# Patient Record
Sex: MALE | Race: WHITE | NOT HISPANIC OR LATINO | Employment: FULL TIME | URBAN - METROPOLITAN AREA
[De-identification: names, ages, dates, MRNs, and addresses within clinical notes are randomized per-mention and may not be internally consistent; named-entity substitution may affect disease eponyms.]

---

## 2022-02-01 ENCOUNTER — TELEPHONE (OUTPATIENT)
Dept: UROLOGY | Facility: CLINIC | Age: 27
End: 2022-02-01
Payer: COMMERCIAL

## 2022-02-01 NOTE — TELEPHONE ENCOUNTER
----- Message from Fernanda Knapp sent at 2/1/2022 11:45 AM CST -----  Contact: Patient  Patient would like a call back concerning questions about a vasectomy consult, before providing all of his information. Please call at Ph .[phone] (home)

## 2022-02-17 ENCOUNTER — OFFICE VISIT (OUTPATIENT)
Dept: UROLOGY | Facility: CLINIC | Age: 27
End: 2022-02-17
Payer: COMMERCIAL

## 2022-02-17 VITALS
HEIGHT: 70 IN | BODY MASS INDEX: 19.76 KG/M2 | WEIGHT: 138 LBS | DIASTOLIC BLOOD PRESSURE: 70 MMHG | TEMPERATURE: 98 F | RESPIRATION RATE: 18 BRPM | SYSTOLIC BLOOD PRESSURE: 120 MMHG | HEART RATE: 74 BPM

## 2022-02-17 DIAGNOSIS — Z30.2 ENCOUNTER FOR STERILIZATION: Primary | ICD-10-CM

## 2022-02-17 PROCEDURE — 3008F PR BODY MASS INDEX (BMI) DOCUMENTED: ICD-10-PCS | Mod: CPTII,S$GLB,, | Performed by: UROLOGY

## 2022-02-17 PROCEDURE — 3078F DIAST BP <80 MM HG: CPT | Mod: CPTII,S$GLB,, | Performed by: UROLOGY

## 2022-02-17 PROCEDURE — 1159F MED LIST DOCD IN RCRD: CPT | Mod: CPTII,S$GLB,, | Performed by: UROLOGY

## 2022-02-17 PROCEDURE — 3074F SYST BP LT 130 MM HG: CPT | Mod: CPTII,S$GLB,, | Performed by: UROLOGY

## 2022-02-17 PROCEDURE — 1160F RVW MEDS BY RX/DR IN RCRD: CPT | Mod: CPTII,S$GLB,, | Performed by: UROLOGY

## 2022-02-17 PROCEDURE — 3008F BODY MASS INDEX DOCD: CPT | Mod: CPTII,S$GLB,, | Performed by: UROLOGY

## 2022-02-17 PROCEDURE — 3078F PR MOST RECENT DIASTOLIC BLOOD PRESSURE < 80 MM HG: ICD-10-PCS | Mod: CPTII,S$GLB,, | Performed by: UROLOGY

## 2022-02-17 PROCEDURE — 1160F PR REVIEW ALL MEDS BY PRESCRIBER/CLIN PHARMACIST DOCUMENTED: ICD-10-PCS | Mod: CPTII,S$GLB,, | Performed by: UROLOGY

## 2022-02-17 PROCEDURE — 3074F PR MOST RECENT SYSTOLIC BLOOD PRESSURE < 130 MM HG: ICD-10-PCS | Mod: CPTII,S$GLB,, | Performed by: UROLOGY

## 2022-02-17 PROCEDURE — 99204 PR OFFICE/OUTPT VISIT, NEW, LEVL IV, 45-59 MIN: ICD-10-PCS | Mod: S$GLB,,, | Performed by: UROLOGY

## 2022-02-17 PROCEDURE — 99204 OFFICE O/P NEW MOD 45 MIN: CPT | Mod: S$GLB,,, | Performed by: UROLOGY

## 2022-02-17 PROCEDURE — 1159F PR MEDICATION LIST DOCUMENTED IN MEDICAL RECORD: ICD-10-PCS | Mod: CPTII,S$GLB,, | Performed by: UROLOGY

## 2022-02-17 NOTE — PROGRESS NOTES
Subjective:       Patient ID: John Bucio is a 26 y.o. male.    Chief Complaint: Sterilization (3 kids )      HPI:  26-year-old male with 3 children desires sterility    Past Medical History: History reviewed. No pertinent past medical history.    Past Surgical Historical: History reviewed. No pertinent surgical history.     Medications:      Past Social History:   Social History     Socioeconomic History    Marital status:    Tobacco Use    Smoking status: Current Every Day Smoker     Types: Cigarettes    Smokeless tobacco: Current User     Types: Chew   Substance and Sexual Activity    Alcohol use: Yes    Sexual activity: Yes     Partners: Female       Allergies: Review of patient's allergies indicates:  No Known Allergies     Family History: History reviewed. No pertinent family history.     Review of Systems:  Review of Systems   Constitutional: Negative for activity change and appetite change.   HENT: Negative for congestion and dental problem.    Eyes: Negative for visual disturbance.   Respiratory: Negative for chest tightness and shortness of breath.    Cardiovascular: Negative for chest pain.   Gastrointestinal: Negative for abdominal distention and abdominal pain.   Genitourinary: Negative for decreased urine volume, difficulty urinating, dysuria, enuresis, flank pain, frequency, genital sores, hematuria, penile discharge, penile pain, penile swelling, scrotal swelling, testicular pain and urgency.   Musculoskeletal: Negative for back pain and neck pain.   Skin: Negative for color change.   Neurological: Negative for dizziness.   Hematological: Negative for adenopathy.   Psychiatric/Behavioral: Negative for agitation, behavioral problems and confusion.       Physical Exam:  Physical Exam  Constitutional:       General: He is not in acute distress.     Appearance: He is well-developed and well-nourished.   HENT:      Head: Normocephalic and atraumatic.      Nose: Nose normal.      Mouth/Throat:       Mouth: Oropharynx is clear and moist.   Eyes:      General: No scleral icterus.     Extraocular Movements: EOM normal.      Conjunctiva/sclera: Conjunctivae normal.      Pupils: Pupils are equal, round, and reactive to light.   Neck:      Thyroid: No thyromegaly.      Trachea: No tracheal deviation.   Cardiovascular:      Rate and Rhythm: Normal rate and regular rhythm.      Heart sounds: Normal heart sounds.   Pulmonary:      Effort: Pulmonary effort is normal. No respiratory distress.      Breath sounds: Normal breath sounds. No wheezing or rales.   Abdominal:      General: Bowel sounds are normal. There is no distension.      Palpations: Abdomen is soft.      Tenderness: There is no abdominal tenderness. There is no guarding or rebound.   Genitourinary:     Penis: Normal. No tenderness.       Prostate: Normal.   Musculoskeletal:         General: No deformity or edema. Normal range of motion.      Cervical back: Neck supple.   Lymphadenopathy:      Cervical: No cervical adenopathy.   Skin:     General: Skin is warm and dry.      Findings: No erythema or rash.   Neurological:      Mental Status: He is alert and oriented to person, place, and time.      Cranial Nerves: No cranial nerve deficit.   Psychiatric:         Mood and Affect: Mood and affect normal.         Behavior: Behavior normal.         Assessment/Plan:       Problem List Items Addressed This Visit    None     Visit Diagnoses     Encounter for sterilization    -  Primary    Relevant Orders    Vasectomy             We had a long discussion regarding vasectomy including the risks and benefits.  The patient understands the permanent nature of the procedure he also understands the potential risks of vasectomy including but not limited to injury to the testicle loss of testicle bleeding hematoma infection and testicular atrophy.  Patient understands these risks is willing to proceed we will schedule vasectomy next available date.

## 2022-03-23 DIAGNOSIS — Z30.2 ENCOUNTER FOR STERILIZATION: Primary | ICD-10-CM

## 2022-03-23 RX ORDER — DIAZEPAM 10 MG/1
10 TABLET ORAL ONCE
Qty: 1 TABLET | Refills: 0 | Status: SHIPPED | OUTPATIENT
Start: 2022-04-05 | End: 2022-04-05

## 2022-03-25 ENCOUNTER — CLINICAL SUPPORT (OUTPATIENT)
Dept: UROLOGY | Facility: CLINIC | Age: 27
End: 2022-03-25
Payer: COMMERCIAL

## 2022-03-25 DIAGNOSIS — Z30.2 ENCOUNTER FOR STERILIZATION: Primary | ICD-10-CM

## 2022-03-25 NOTE — PROGRESS NOTES
Pre-op instructions and surgery consent for VAS reviewed with pt. Pt verbalized understanding. Surgery consent signed by pt.

## 2022-04-04 ENCOUNTER — TELEPHONE (OUTPATIENT)
Dept: UROLOGY | Facility: CLINIC | Age: 27
End: 2022-04-04
Payer: COMMERCIAL

## 2022-04-05 ENCOUNTER — TELEPHONE (OUTPATIENT)
Dept: UROLOGY | Facility: CLINIC | Age: 27
End: 2022-04-05

## 2022-04-05 ENCOUNTER — PROCEDURE VISIT (OUTPATIENT)
Dept: UROLOGY | Facility: CLINIC | Age: 27
End: 2022-04-05
Payer: COMMERCIAL

## 2022-04-05 VITALS
HEIGHT: 70 IN | SYSTOLIC BLOOD PRESSURE: 116 MMHG | TEMPERATURE: 97 F | WEIGHT: 136.25 LBS | HEART RATE: 73 BPM | RESPIRATION RATE: 20 BRPM | OXYGEN SATURATION: 99 % | DIASTOLIC BLOOD PRESSURE: 62 MMHG | BODY MASS INDEX: 19.51 KG/M2

## 2022-04-05 DIAGNOSIS — Z30.2 ENCOUNTER FOR STERILIZATION: Primary | ICD-10-CM

## 2022-04-05 PROCEDURE — 55250 REMOVAL OF SPERM DUCT(S): CPT | Mod: S$GLB,,, | Performed by: UROLOGY

## 2022-04-05 PROCEDURE — 55250 VASECTOMY: ICD-10-PCS | Mod: S$GLB,,, | Performed by: UROLOGY

## 2022-04-05 NOTE — PROCEDURES
"Vasectomy    Date/Time: 4/5/2022 8:30 AM  Performed by: Jung Brumfield MD  Authorized by: Jung Brumfield MD     Consent Done?:  Yes (Written)  Time out: Immediately prior to procedure a "time out" was called to verify the correct patient, procedure, equipment, support staff and site/side marked as required.    Indications:  [place] male  Patient sedated: No    Preparation: Patient was prepped and draped in usual sterile fashion    Incisions:  1  Length vas excised:  2.5 cm  Vas:  Fulgurated and Buried  Sutures:  3-0 chromic SH  Same procedure performed on both sides    Patient tolerance:  Patient tolerated the procedure well with no immediate complications     Patient was brought to the procedure room placed on table in supine position he was then prepped and draped in the usual sterile fashion. A 2 cm incision was made upper portion of the midline of the scrotum secured into the dartos fascia of the right vas deferens was isolated and skeletonized using the Vas clamps, and at this point a 2 cm segment of the vas deferens was excised, both ends of the vas were fulgurated the proximal end was dunked into the surrounding dartos and pursestring closed the that is on the right side was returned to the scrotum the identical procedure was done on the contralateral side the incision was closed with a 3 O chromic suture patient tolerated the procedure with oral complications.      "

## 2022-04-05 NOTE — TELEPHONE ENCOUNTER
Pt stated he is not having any issues will call back if needed.    ----- Message from Sasha Conner sent at 4/5/2022 11:16 AM CDT -----  Pt requesting call back, pt will elaborate. Pt call back .[phone] cell

## 2022-04-05 NOTE — TELEPHONE ENCOUNTER
Informed pt not to take norco on empty stomach and to stay on couch and not do anything. Asked if his wife could get a blood pressure machine to keep track of his pressure, he verbalized understanding.l    ----- Message from Naheed Garcia sent at 4/5/2022  3:36 PM CDT -----  Contact: self  The patient had a vasectomy today and had an episode where he experienced sweats and chills, and became pale and lightheaded. Please call back at [phone] (offered patient nurse on call since there was no response at office extensions, patient declined and would rather speak with Dr Brumfield's staff).

## 2022-04-05 NOTE — PATIENT INSTRUCTIONS
NO STRENUOUS ACTIVITY FOR 7 DAYS  NO SEXUAL INTERCOURSE FOR 7-14 DAYS  Patient Education       Vasectomy Discharge Instructions   About this topic   A vasectomy is a permanent type of birth control. After this surgery, you are very unlikely to get a person pregnant. Sperm are made in the testes. The testes are small round organs located in the skin sac that hangs between your legs. Sperm are stored in a small organ on top of the testes. The organ is called the epididymis. The sperm travel from the epididymis through a small tube called the vas deferens when you ejaculate. The fluid you ejaculate is called semen. The sperm in this fluid can cause a pregnancy.  The doctor cuts or blocks the vas deferens during a vasectomy. After this procedure, you will still ejaculate but the semen will not contain any sperm.     What care is needed at home?   Ask your doctor what you need to do when you go home. Make sure you ask questions if you do not understand what the doctor says. Asking questions will help you know what you need to do.  Talk to your doctor about how to care for your cut sites. Ask your doctor about:  When you should change your bandages  When you may take a bath or shower  If you need to be careful with lifting things over 10 pounds (4.5 kg)  When you can go back to your normal activities like work and driving.  Always wash your hands before and after touching your wound or dressing.  Place an ice pack or a bag of frozen peas wrapped in a towel over the painful part. Never put ice right on the skin. Do not leave the ice on more than 10 to 15 minutes at a time.  Wear supportive clothing, like a jockstrap or jockey shorts, while your wound heals.  Relax and keep your feet raised when you get home. Lying on your back may feel most comfortable.  You may have blood in your semen for the first few weeks. This is normal.  You may have sex after 1 to 4 weeks or when your doctor says so. You can still get your partner  pregnant during this time. You need to use other forms of birth control to prevent pregnancy.  You may have to wait up to 3 months to have a zero sperm count. You will need to get your sperm count checked. Use birth control until your sperm count is checked and is zero.  What follow-up care is needed?   Your doctor may ask you to make visits to the office to check on your progress. Be sure to keep your visits.  You may have stitches or staples. If so, your doctor will often want to remove the stitches or staples in 1 to 2 weeks.  You will need to follow up with your doctor to have your sperm count checked after about 3 months. Checking with your doctor is an important step in making sure you are not able to get a person pregnant.  What drugs may be needed?   The doctor may order drugs to:  Help with pain and swelling  Prevent infection  Will physical activity be limited?   You may need to rest for a few days after the procedure. Avoid standing or walking too long. Avoid heavy lifting and hard exercise for up to 1 to 3 weeks.  What problems could happen?   Infection  Bleeding and bruising  Swelling  Pain  Opening of surgical wound  Blood supply to the testicle is damaged  There are still sperm in your semen  When do I need to call the doctor?   Signs of infection such as a fever of 100.4°F (38°C) or higher, chills, pain with passing urine.  Signs of wound infection such as swelling, redness, warmth around the wound; too much pain when touched; yellowish, greenish, or bloody discharge; foul smell coming from the cut site; cut site opens up.  Very bad pain in your scrotum  Problems with your erection or ejaculation  You are not feeling better in 2 to 3 days or you are feeling worse  Helpful tips   A vasectomy is meant to be a permanent form of contraception. You should be 100% sure before you undergo this procedure. You and your partner should have talked about this procedure thoroughly. Vasectomy reversal surgery is  available, but it is complicated and does not always work.  A vasectomy does not protect you from sexually-transmitted diseases. You will still need to wear a condom to protect yourself and your partner against these infections.  Teach Back: Helping You Understand   The Teach Back Method helps you understand the information we are giving you. After you talk with the staff, tell them in your own words what you learned. This helps to make sure the staff has described each thing clearly. It also helps to explain things that may have been confusing. Before going home, make sure you can do these:  I can tell you about my procedure.  I can tell you what may help ease my pain.  I can tell you how to care for my cut site.  I can tell you what I will do if I have a fever, chills, or bad pain.  Where can I learn more?   American Academy [employer]  https://familydoctor.org/vasectomy-what-to-expect/   [employer]  https://www.betterhealth.antione.gov.[place]/health/healthyliving/contraception-vasectomy   NHS Choices  https://www.nhs.uk/Conditions/contraception-guide/Pages/vasectomy-male-sterilisation.aspx   Last Reviewed Date   2021-06-07  Consumer Information Use and Disclaimer   This information is not specific medical advice and does not replace information you receive from your health care provider. This is only a brief summary of general information. It does NOT include all information about conditions, illnesses, injuries, tests, procedures, treatments, therapies, discharge instructions or life-style choices that may apply to you. You must talk with your health care provider for complete information about your health and treatment options. This information should not be used to decide whether or not to accept your health care providers advice, instructions or recommendations. Only your health care provider has the knowledge and training to provide advice that is right for you.  Copyright   Copyright © 2021  [employer] and its affiliates and/or licensors. All rights reserved.

## 2022-04-25 ENCOUNTER — TELEPHONE (OUTPATIENT)
Dept: UROLOGY | Facility: CLINIC | Age: 27
End: 2022-04-25
Payer: COMMERCIAL

## 2022-04-25 NOTE — TELEPHONE ENCOUNTER
Contacted pt, who states he's still having some pain post vas. Discussed with EHR,  Symptoms are normal. Testicle is building pressure b/c theres nowhere for the semen to go. Pt verbalized understanding. BJP

## 2022-04-25 NOTE — TELEPHONE ENCOUNTER
Attempted to contact. [facility]. BJP    ----- Message from Nya Marsh sent at 4/25/2022 10:17 AM CDT -----  Type:  Needs Medical Advice    Who Called: John Bucio    Symptoms (please be specific): -   How long has patient had these symptoms:  -  Pharmacy name and phone #:  -  Would the patient rather a call back or a response via [facility]?    Best Call Back Number: [phone]    Additional Information:  pt would like to speak w/ nurse about his progress please call